# Patient Record
Sex: FEMALE | Race: WHITE
[De-identification: names, ages, dates, MRNs, and addresses within clinical notes are randomized per-mention and may not be internally consistent; named-entity substitution may affect disease eponyms.]

---

## 2018-01-24 ENCOUNTER — HOSPITAL ENCOUNTER (OUTPATIENT)
Dept: HOSPITAL 90 - RAH | Age: 78
Discharge: HOME | End: 2018-01-24
Attending: INTERNAL MEDICINE
Payer: MEDICARE

## 2018-01-24 DIAGNOSIS — M79.602: ICD-10-CM

## 2018-01-24 DIAGNOSIS — M19.012: Primary | ICD-10-CM

## 2018-01-24 PROCEDURE — 73060 X-RAY EXAM OF HUMERUS: CPT

## 2018-01-24 PROCEDURE — 73030 X-RAY EXAM OF SHOULDER: CPT

## 2019-11-20 ENCOUNTER — HOSPITAL ENCOUNTER (OUTPATIENT)
Dept: HOSPITAL 90 - RAH | Age: 79
Discharge: HOME | End: 2019-11-20
Attending: INTERNAL MEDICINE
Payer: MEDICARE

## 2019-11-20 DIAGNOSIS — Z01.818: Primary | ICD-10-CM

## 2019-11-20 PROCEDURE — 71046 X-RAY EXAM CHEST 2 VIEWS: CPT

## 2025-02-26 ENCOUNTER — HOSPITAL ENCOUNTER (OUTPATIENT)
Dept: HOSPITAL 90 - RAH | Age: 85
Discharge: HOME | End: 2025-02-26
Attending: INTERNAL MEDICINE
Payer: MEDICARE

## 2025-02-26 DIAGNOSIS — M16.12: Primary | ICD-10-CM

## 2025-02-26 PROCEDURE — 73502 X-RAY EXAM HIP UNI 2-3 VIEWS: CPT

## 2025-02-26 NOTE — [facility]
HIP UNILAT 2-3VW LEFT



HISTORY: Osteoarthritis



COMPARISON: None



TECHNIQUE: 3 images of left hip were obtained. 



FINDINGS: Destructive changes are seen of the left hip. Joint space

narrowing is seen. There is no acute displaced fracture or dislocation. 

Degenerative changes are seen.



IMPRESSION:

1. Findings as described above.

## 2025-03-31 ENCOUNTER — HOSPITAL ENCOUNTER (OUTPATIENT)
Dept: HOSPITAL 90 - RAH | Age: 85
Discharge: HOME | End: 2025-03-31
Attending: INTERNAL MEDICINE
Payer: MEDICARE
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
## 2025-03-31 DIAGNOSIS — S70.02XA: Primary | ICD-10-CM
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
## 2025-03-31 DIAGNOSIS — S70.12XA: ICD-10-CM

## 2025-03-31 DIAGNOSIS — M25.852: ICD-10-CM

## 2025-03-31 DIAGNOSIS — N32.89: ICD-10-CM

## 2025-03-31 DIAGNOSIS — M25.452: ICD-10-CM

## 2025-03-31 DIAGNOSIS — M16.12: ICD-10-CM

## 2025-03-31 DIAGNOSIS — R93.6: ICD-10-CM

## 2025-03-31 PROCEDURE — 73721 MRI JNT OF LWR EXTRE W/O DYE: CPT

## 2025-03-31 NOTE — [facility]
MR HIP LEFT WO



HISTORY: Left hip primary osteoarthritis



COMPARISON: None



TECHNIQUE: MRI of the left hip  was performed utilizing multiple pulse

sequences in axial, coronal and sagittal planes. Patient was not given

contrast through intravenous route.



FINDINGS: Increased signal intensity is seen involving the left

acetabulum and left proximal femur consistent bone bruise

(microtrabecular fracture). There are degenerative changes with joint

space narrowing. Small joint effusion is seen. No evidence of avascular

necrosis, acute displaced fracture or dislocation is seen. Bladder is

poorly distended with apparent wall thickening. Degenerative changes are

seen.



IMPRESSION:

1. Bone bruising in the left acetabulum and left proximal femur. Small

joint effusion. No fracture or dislocation. DJD